# Patient Record
Sex: FEMALE | Race: BLACK OR AFRICAN AMERICAN
[De-identification: names, ages, dates, MRNs, and addresses within clinical notes are randomized per-mention and may not be internally consistent; named-entity substitution may affect disease eponyms.]

---

## 2018-03-01 ENCOUNTER — HOSPITAL ENCOUNTER (EMERGENCY)
Dept: HOSPITAL 61 - ER | Age: 43
Discharge: HOME | End: 2018-03-01
Payer: SELF-PAY

## 2018-03-01 DIAGNOSIS — K59.00: Primary | ICD-10-CM

## 2018-03-01 DIAGNOSIS — F17.210: ICD-10-CM

## 2018-03-01 PROCEDURE — 99284 EMERGENCY DEPT VISIT MOD MDM: CPT

## 2018-03-01 PROCEDURE — 74018 RADEX ABDOMEN 1 VIEW: CPT

## 2018-03-01 RX ADMIN — ONDANSETRON 1 MG: 4 TABLET, ORALLY DISINTEGRATING ORAL at 20:50

## 2018-03-01 RX ADMIN — DOCUSATE SODIUM 1 MG: 283 LIQUID RECTAL at 19:45

## 2018-06-13 ENCOUNTER — HOSPITAL ENCOUNTER (EMERGENCY)
Dept: HOSPITAL 61 - ER | Age: 43
Discharge: LEFT BEFORE DISCHARGE | End: 2018-06-13
Payer: SELF-PAY

## 2018-06-13 DIAGNOSIS — R07.89: Primary | ICD-10-CM

## 2018-06-13 DIAGNOSIS — F17.200: ICD-10-CM

## 2018-06-13 DIAGNOSIS — F14.10: ICD-10-CM

## 2018-06-13 DIAGNOSIS — F12.10: ICD-10-CM

## 2018-06-13 LAB
ADD MAN DIFF?: NO
ALBUMIN SERPL-MCNC: 3.8 G/DL (ref 3.4–5)
ALBUMIN/GLOB SERPL: 1 {RATIO} (ref 1–1.7)
ALP SERPL-CCNC: 90 U/L (ref 46–116)
ALT (SGPT): 26 U/L (ref 14–59)
AMPHETAMINE/METHAMPHETAMINE: (no result)
ANION GAP SERPL CALC-SCNC: 10 MMOL/L (ref 6–14)
AST SERPL-CCNC: 22 U/L (ref 15–37)
BACTERIA,URINE: (no result) /HPF
BARBITURATES: (no result)
BASO #: 0.1 X10^3/UL (ref 0–0.2)
BASO %: 1 % (ref 0–3)
BENZODIAZEPINES: (no result)
BILIRUBIN,URINE: NEGATIVE
BLOOD UREA NITROGEN: 9 MG/DL (ref 7–20)
BUN/CREAT SERPL: 10 (ref 6–20)
CALCIUM: 9.3 MG/DL (ref 8.5–10.1)
CANNABINOIDS: (no result)
CHLORIDE: 105 MMOL/L (ref 98–107)
CK SERPL-CCNC: 147 U/L (ref 26–192)
CKMB INDEX: 0.3 % (ref 0–4)
CKMB MASS: < 0.5 NG/ML (ref 0–3.6)
CLARITY,URINE: CLEAR
CO2 SERPL-SCNC: 24 MMOL/L (ref 21–32)
COCAINE: (no result)
COLOR,URINE: YELLOW
CREAT SERPL-MCNC: 0.9 MG/DL (ref 0.6–1)
EOS #: 0.1 X10^3/UL (ref 0–0.7)
EOS %: 2 % (ref 0–3)
ETHANOL, URINE: (no result)
GFR SERPLBLD BASED ON 1.73 SQ M-ARVRAT: 83.1 ML/MIN
GLOBULIN SER-MCNC: 4 G/DL (ref 2.2–3.8)
GLUCOSE SERPL-MCNC: 109 MG/DL (ref 70–99)
GLUCOSE,URINE: NEGATIVE MG/DL
HCG SERPL-ACNC: 9 X10^3/UL (ref 4–11)
HEMATOCRIT: 36.4 % (ref 36–47)
HEMOGLOBIN: 12.6 G/DL (ref 12–15.5)
LYMPH #: 3.3 X10^3/UL (ref 1–4.8)
LYMPH %: 37 % (ref 24–48)
MAGNESIUM: 2.3 MG/DL (ref 1.8–2.4)
MEAN CORPUSCULAR HEMOGLOBIN: 29 PG (ref 25–35)
MEAN CORPUSCULAR HGB CONC: 35 G/DL (ref 31–37)
MEAN CORPUSCULAR VOLUME: 84 FL (ref 79–100)
METHADONE: (no result)
MONO #: 0.4 X10^3/UL (ref 0–1.1)
MONO %: 5 % (ref 0–9)
NEUT #: 5 X10^3UL (ref 1.8–7.7)
NEUT %: 56 % (ref 31–73)
NITRITE,URINE: NEGATIVE
NT-PRO BNP: 116 PG/ML (ref 0–124)
OPIATES: (no result)
PH,URINE: 6
PHENCYCLIDINE: (no result)
PLATELET COUNT: 247 X10^3/UL (ref 140–400)
POTASSIUM SERPL-SCNC: 4.2 MMOL/L (ref 3.5–5.1)
PROTEIN,URINE: NEGATIVE MG/DL
RBC,URINE: 0 /HPF (ref 0–2)
RED BLOOD COUNT: 4.35 X10^6/UL (ref 3.5–5.4)
RED CELL DISTRIBUTION WIDTH: 14.6 % (ref 11.5–14.5)
SODIUM: 139 MMOL/L (ref 136–145)
SPECIFIC GRAVITY,URINE: >=1.03
SQUAMOUS EPITHELIAL CELL,UR: (no result) /LPF
THYROID STIM HORMONE (TSH): 1.96 UIU/ML (ref 0.36–3.74)
TOTAL BILIRUBIN: 0.3 MG/DL (ref 0.2–1)
TOTAL PROTEIN: 7.8 G/DL (ref 6.4–8.2)
TROPONINI: < 0.017 NG/ML (ref 0–0.06)
UROBILINOGEN,URINE: 1 MG/DL

## 2018-06-13 PROCEDURE — 93005 ELECTROCARDIOGRAM TRACING: CPT

## 2018-06-13 PROCEDURE — 83880 ASSAY OF NATRIURETIC PEPTIDE: CPT

## 2018-06-13 PROCEDURE — 81001 URINALYSIS AUTO W/SCOPE: CPT

## 2018-06-13 PROCEDURE — 84484 ASSAY OF TROPONIN QUANT: CPT

## 2018-06-13 PROCEDURE — 84443 ASSAY THYROID STIM HORMONE: CPT

## 2018-06-13 PROCEDURE — 36415 COLL VENOUS BLD VENIPUNCTURE: CPT

## 2018-06-13 PROCEDURE — 80053 COMPREHEN METABOLIC PANEL: CPT

## 2018-06-13 PROCEDURE — 82553 CREATINE MB FRACTION: CPT

## 2018-06-13 PROCEDURE — 85025 COMPLETE CBC W/AUTO DIFF WBC: CPT

## 2018-06-13 PROCEDURE — 83735 ASSAY OF MAGNESIUM: CPT

## 2018-06-13 PROCEDURE — 99285 EMERGENCY DEPT VISIT HI MDM: CPT

## 2018-06-13 PROCEDURE — 80307 DRUG TEST PRSMV CHEM ANLYZR: CPT

## 2018-06-13 PROCEDURE — 71045 X-RAY EXAM CHEST 1 VIEW: CPT

## 2018-06-13 RX ADMIN — ASPIRIN 325 MG ORAL TABLET 1 MG: 325 PILL ORAL at 13:33

## 2018-06-13 RX ADMIN — BACITRACIN 1 MLS/HR: 5000 INJECTION, POWDER, FOR SOLUTION INTRAMUSCULAR at 13:34

## 2018-12-20 ENCOUNTER — HOSPITAL ENCOUNTER (EMERGENCY)
Dept: HOSPITAL 61 - ER | Age: 43
Discharge: HOME | End: 2018-12-20
Payer: SELF-PAY

## 2018-12-20 VITALS — WEIGHT: 170 LBS | HEIGHT: 64 IN | BODY MASS INDEX: 29.02 KG/M2

## 2018-12-20 VITALS — SYSTOLIC BLOOD PRESSURE: 122 MMHG | DIASTOLIC BLOOD PRESSURE: 80 MMHG

## 2018-12-20 DIAGNOSIS — N94.6: ICD-10-CM

## 2018-12-20 DIAGNOSIS — F17.200: ICD-10-CM

## 2018-12-20 DIAGNOSIS — R11.2: ICD-10-CM

## 2018-12-20 DIAGNOSIS — R10.30: Primary | ICD-10-CM

## 2018-12-20 LAB
ANION GAP SERPL CALC-SCNC: 12 MMOL/L (ref 6–14)
APTT PPP: YELLOW S
BACTERIA #/AREA URNS HPF: (no result) /HPF
BASOPHILS # BLD AUTO: 0.1 X10^3/UL (ref 0–0.2)
BASOPHILS NFR BLD: 1 % (ref 0–3)
BILIRUB UR QL STRIP: NEGATIVE
BUN SERPL-MCNC: 6 MG/DL (ref 7–20)
CALCIUM SERPL-MCNC: 9.5 MG/DL (ref 8.5–10.1)
CHLORIDE SERPL-SCNC: 102 MMOL/L (ref 98–107)
CO2 SERPL-SCNC: 23 MMOL/L (ref 21–32)
CREAT SERPL-MCNC: 0.7 MG/DL (ref 0.6–1)
EOSINOPHIL NFR BLD: 0.1 X10^3/UL (ref 0–0.7)
EOSINOPHIL NFR BLD: 1 % (ref 0–3)
ERYTHROCYTE [DISTWIDTH] IN BLOOD BY AUTOMATED COUNT: 14.4 % (ref 11.5–14.5)
FIBRINOGEN PPP-MCNC: (no result) MG/DL
GFR SERPLBLD BASED ON 1.73 SQ M-ARVRAT: 110.5 ML/MIN
GLUCOSE SERPL-MCNC: 91 MG/DL (ref 70–99)
HCT VFR BLD CALC: 35.6 % (ref 36–47)
HGB BLD-MCNC: 12.6 G/DL (ref 12–15.5)
LYMPHOCYTES # BLD: 3.2 X10^3/UL (ref 1–4.8)
LYMPHOCYTES NFR BLD AUTO: 30 % (ref 24–48)
MCH RBC QN AUTO: 29 PG (ref 25–35)
MCHC RBC AUTO-ENTMCNC: 35 G/DL (ref 31–37)
MCV RBC AUTO: 82 FL (ref 79–100)
MONO #: 0.5 X10^3/UL (ref 0–1.1)
MONOCYTES NFR BLD: 5 % (ref 0–9)
NEUT #: 6.6 X10^3UL (ref 1.8–7.7)
NEUTROPHILS NFR BLD AUTO: 63 % (ref 31–73)
NITRITE UR QL STRIP: NEGATIVE
PH UR STRIP: 7.5 [PH]
PLATELET # BLD AUTO: 243 X10^3/UL (ref 140–400)
POTASSIUM SERPL-SCNC: 3.9 MMOL/L (ref 3.5–5.1)
PROT UR STRIP-MCNC: NEGATIVE MG/DL
RBC # BLD AUTO: 4.33 X10^6/UL (ref 3.5–5.4)
RBC #/AREA URNS HPF: (no result) /HPF (ref 0–2)
SODIUM SERPL-SCNC: 137 MMOL/L (ref 136–145)
SQUAMOUS #/AREA URNS LPF: (no result) /LPF
UROBILINOGEN UR-MCNC: 1 MG/DL
WBC # BLD AUTO: 10.5 X10^3/UL (ref 4–11)
WBC #/AREA URNS HPF: (no result) /HPF (ref 0–4)

## 2018-12-20 PROCEDURE — 81025 URINE PREGNANCY TEST: CPT

## 2018-12-20 PROCEDURE — 87086 URINE CULTURE/COLONY COUNT: CPT

## 2018-12-20 PROCEDURE — 36415 COLL VENOUS BLD VENIPUNCTURE: CPT

## 2018-12-20 PROCEDURE — 85025 COMPLETE CBC W/AUTO DIFF WBC: CPT

## 2018-12-20 PROCEDURE — 76856 US EXAM PELVIC COMPLETE: CPT

## 2018-12-20 PROCEDURE — 80048 BASIC METABOLIC PNL TOTAL CA: CPT

## 2018-12-20 PROCEDURE — 76830 TRANSVAGINAL US NON-OB: CPT

## 2018-12-20 PROCEDURE — 81001 URINALYSIS AUTO W/SCOPE: CPT

## 2018-12-20 NOTE — PHYS DOC
Past Medical History


Past Medical History:  Ovarian Cyst, Other


Additional Past Medical Histor:  PRE CANCEROUS CERVICAL SCREEN


Past Surgical History:  Tonsillectomy


Additional Information:  


1/2 PACK/DAY


Alcohol Use:  Occasionally


Drug Use:  Cocaine, Marijuana





Adult General


Chief Complaint


Chief Complaint:  VAGINAL BLEEDING





HPI


HPI





33-year-old female presents to ER via POV with complaints of lower mid 

abdominal pain which started yesterday that time of her menstrual cycle onset. 

Patient reports over the past 3-4 months she's had this type of discomfort with 

her menstrual cycles. Patient reports she has also had nausea and vomiting over 

the past several months where her menstrual cycle start. She reports this 

morning since 7 AM she has gone through 3 peripads. Patient denies any vaginal 

discharge when she is not on her cycle. Patient denies concerns for STDs as she'

s been with her partner for 20 years. Patient denies fever or urinary symptoms. 

Patient reports she has had regular bowel movements and denies change in her 

appetite. Patient did take Midol this morning.





Review of Systems


Review of Systems





Constitutional: Denies fever or chills []


Eyes: Denies change in visual acuity, redness, or eye pain []


HENT: Denies nasal congestion or sore throat []


Respiratory: Denies cough or shortness of breath []


Cardiovascular: No additional information not addressed in HPI []


GI: Denies bloody stools or diarrhea []


: Denies dysuria or hematuria []


Musculoskeletal: Denies back pain or joint pain []


Integument: Denies rash or skin lesions []


Neurologic: Denies headache, focal weakness or sensory changes []





All other systems were reviewed and found to be within normal limits, except as 

documented in this note.





Allergies


Allergies





Allergies








Coded Allergies Type Severity Reaction Last Updated Verified


 


  No Known Drug Allergies    4/24/16 No











Physical Exam


Physical Exam





Constitutional: Well developed, well nourished, no acute distress, non-toxic 

appearance. []


HENT: Normocephalic, atraumatic, bilateral external ears normal, oropharynx 

moist, no oral exudates, nose normal. []


Eyes: PERRLA, EOMI, conjunctiva normal, no discharge. [] 


Neck: Normal range of motion, no tenderness, supple, no stridor. [] 


Cardiovascular: Heart rate regular rhythm, no murmur []


Lungs & Thorax:  Bilateral breath sounds clear to auscultation []


Abdomen: Bowel sounds normal, soft, no tenderness, no masses, no pulsatile 

masses. [] 


Skin: Warm, dry, no erythema, no rash. [] 


Back: No tenderness, no CVA tenderness. [] 


Extremities: No tenderness, no cyanosis, no clubbing, ROM intact, no edema. [] 


Neurologic: Alert and oriented X 3, normal motor function, normal sensory 

function, no focal deficits noted. []


Psychologic: Affect normal, judgement normal, mood normal. []





Current Patient Data


Vital Signs





 Vital Signs








  Date Time  Temp Pulse Resp B/P (MAP) Pulse Ox O2 Delivery O2 Flow Rate FiO2


 


12/20/18 13:00  75 18 122/80 (94) 98 Room Air  


 


12/20/18 10:55 97.9       





 97.9       








Lab Values





 Laboratory Tests








Test


 12/20/18


12:21 12/20/18


12:30 12/20/18


12:33


 


Urine Collection Type Unknown    


 


Urine Color Yellow    


 


Urine Clarity Cloudy    


 


Urine pH 7.5    


 


Urine Specific Gravity 1.020    


 


Urine Protein


 Negative mg/dL


(NEG-TRACE) 


 





 


Urine Glucose (UA)


 Negative mg/dL


(NEG) 


 





 


Urine Ketones (Stick)


 Negative mg/dL


(NEG) 


 





 


Urine Blood Small (NEG)    


 


Urine Nitrite


 Negative (NEG)


 


 





 


Urine Bilirubin


 Negative (NEG)


 


 





 


Urine Urobilinogen Dipstick


 1.0 mg/dL (0.2


mg/dL) 


 





 


Urine Leukocyte Esterase Small (NEG)    


 


Urine RBC


 6-10 /HPF


(0-2) 


 





 


Urine WBC


 5-10 /HPF


(0-4) 


 





 


Urine Squamous Epithelial


Cells Few /LPF  


 


 





 


Urine Bacteria


 Few /HPF


(0-FEW) 


 





 


Urine Mucus Mod /LPF    


 


White Blood Count


 


 10.5 x10^3/uL


(4.0-11.0) 





 


Red Blood Count


 


 4.33 x10^6/uL


(3.50-5.40) 





 


Hemoglobin


 


 12.6 g/dL


(12.0-15.5) 





 


Hematocrit


 


 35.6 %


(36.0-47.0)  L 





 


Mean Corpuscular Volume


 


 82 fL ()


 





 


Mean Corpuscular Hemoglobin  29 pg (25-35)   


 


Mean Corpuscular Hemoglobin


Concent 


 35 g/dL


(31-37) 





 


Red Cell Distribution Width


 


 14.4 %


(11.5-14.5) 





 


Platelet Count


 


 243 x10^3/uL


(140-400) 





 


Neutrophils (%) (Auto)  63 % (31-73)   


 


Lymphocytes (%) (Auto)  30 % (24-48)   


 


Monocytes (%) (Auto)  5 % (0-9)   


 


Eosinophils (%) (Auto)  1 % (0-3)   


 


Basophils (%) (Auto)  1 % (0-3)   


 


Neutrophils # (Auto)


 


 6.6 x10^3uL


(1.8-7.7) 





 


Lymphocytes # (Auto)


 


 3.2 x10^3/uL


(1.0-4.8) 





 


Monocytes # (Auto)


 


 0.5 x10^3/uL


(0.0-1.1) 





 


Eosinophils # (Auto)


 


 0.1 x10^3/uL


(0.0-0.7) 





 


Basophils # (Auto)


 


 0.1 x10^3/uL


(0.0-0.2) 





 


Sodium Level


 


 137 mmol/L


(136-145) 





 


Potassium Level


 


 3.9 mmol/L


(3.5-5.1) 





 


Chloride Level


 


 102 mmol/L


() 





 


Carbon Dioxide Level


 


 23 mmol/L


(21-32) 





 


Anion Gap  12 (6-14)   


 


Blood Urea Nitrogen


 


 6 mg/dL (7-20)


L 





 


Creatinine


 


 0.7 mg/dL


(0.6-1.0) 





 


Estimated GFR


(Cockcroft-Gault) 


 110.5  


 





 


Glucose Level


 


 91 mg/dL


(70-99) 





 


Calcium Level


 


 9.5 mg/dL


(8.5-10.1) 





 


POC Urine HCG, Qualitative


 


 


 Hcg negative


(Negative)





 Laboratory Tests


12/20/18 12:30








 Laboratory Tests


12/20/18 12:30














Microbiology


12/20/18 Urine Culture - Final, Complete


           


12/20/18 Urine Culture Result 1 (DEVAN) - Final, Complete





EKG


EKG


[]





Radiology/Procedures


Radiology/Procedures


[]





Course & Med Decision Making


Course & Med Decision Making


Pertinent Labs and Imaging studies reviewed. (See chart for details)





1322: RN reports pt is requesting to be discharged and is not wanting pelvic 

exam in ER with plans to f/u with OB/GYN and will have further testing and 

eval. done at their office. She has remained stable while in ER denying any 

increase in abd. pain or vag. bleeding.





Dragon Disclaimer


Dragon Disclaimer


This electronic medical record was generated, in whole or in part, using a 

voice recognition dictation system.





Departure


Departure


Impression:  


 Primary Impression:  


 Abdominal pain


 Additional Impression:  


 Painful menstrual periods


Referrals:  


NO PCP (PCP)








MATT CHARLES Jr, MD


Patient Instructions:  Abdominal Pain, Dysmenorrhea





Additional Instructions:  


As discussed you should follow-up with OB/GYN for further care and evaluation 

for your ongoing symptoms during your menstrual cycle.





You can continue taking Tylenol and/or Midol for pain as directed on container.





Problem Qualifiers











EBONY VALENZUELA Dec 20, 2018 11:28

## 2018-12-20 NOTE — RAD
Pelvic ultrasound

 

Clinical Indication:SUPRAPUBIC PAIN WITH HEAVY MENSES. . 

 

TRANSABDOMINAL SCAN

Left ovary measures about 4 cm diameter with positive blood flow. Right 

ovary not well seen. Uterus poorly seen.

 

TRANSVAGINAL SCAN

Uterus:

*  Size (in centimeters): 9.2 longitudinal by 5.1 AP by 5.8 wide

*  Appearance: Hypoechoic nodule in the anterior uterus measures 15 mm.

*  Endometrium: 9 mm mm endometrial stripe thickness. Uniform appearance.

 

Right ovary:

*  Size: 3.2 cm long axis.

*  Blood flow: Intact

*  Appearance: Contains a 2.5 cm cyst

 

Left ovary:

*  Size: 4.3 cm long axis.

*  Blood flow: Intact

*  Appearance: Cyst identified, measures 2.5 cm.

 

Free fluid: None visualized

 

IMPRESSION: 

1. Bilateral ovarian cysts.

2. Hypoechoic nodule in the uterus, would most commonly represent a 

fibroid.

 

Electronically signed by: Vasquez Larios MD (12/20/2018 12:44 PM) 

[id number]

## 2019-06-20 ENCOUNTER — HOSPITAL ENCOUNTER (EMERGENCY)
Dept: HOSPITAL 61 - ER | Age: 44
Discharge: HOME | End: 2019-06-20
Payer: SELF-PAY

## 2019-06-20 VITALS — DIASTOLIC BLOOD PRESSURE: 90 MMHG | SYSTOLIC BLOOD PRESSURE: 145 MMHG

## 2019-06-20 VITALS — HEIGHT: 64 IN | WEIGHT: 174 LBS | BODY MASS INDEX: 29.71 KG/M2

## 2019-06-20 DIAGNOSIS — R42: ICD-10-CM

## 2019-06-20 DIAGNOSIS — R55: ICD-10-CM

## 2019-06-20 DIAGNOSIS — Z90.89: ICD-10-CM

## 2019-06-20 DIAGNOSIS — N39.0: Primary | ICD-10-CM

## 2019-06-20 LAB
APTT PPP: YELLOW S
BACTERIA #/AREA URNS HPF: (no result) /HPF
BILIRUB UR QL STRIP: NEGATIVE
FIBRINOGEN PPP-MCNC: (no result) MG/DL
NITRITE UR QL STRIP: NEGATIVE
PH UR STRIP: 5.5 [PH]
PROT UR STRIP-MCNC: NEGATIVE MG/DL
RBC #/AREA URNS HPF: (no result) /HPF (ref 0–2)
SQUAMOUS #/AREA URNS LPF: (no result) /LPF
T VAGINALIS URNS QL MICRO: PRESENT
UROBILINOGEN UR-MCNC: 1 MG/DL
WBC #/AREA URNS HPF: (no result) /HPF (ref 0–4)

## 2019-06-20 PROCEDURE — 81001 URINALYSIS AUTO W/SCOPE: CPT

## 2019-06-20 PROCEDURE — 99284 EMERGENCY DEPT VISIT MOD MDM: CPT

## 2019-06-20 PROCEDURE — 81025 URINE PREGNANCY TEST: CPT

## 2019-06-20 PROCEDURE — 87086 URINE CULTURE/COLONY COUNT: CPT

## 2019-06-20 PROCEDURE — 82962 GLUCOSE BLOOD TEST: CPT

## 2019-06-20 NOTE — PHYS DOC
Past Medical History


Past Medical History:  Ovarian Cyst, Other


Additional Past Medical Histor:  PRE CANCEROUS CERVICAL SCREEN


Past Surgical History:  Tonsillectomy


Alcohol Use:  Occasionally


Drug Use:  Cocaine, Marijuana





Adult General


Chief Complaint


Chief Complaint:  MULTIPLE COMPLAINTS





[facility]





43-year-old female presents with a couple different complaints. She states she's

been dizzy and had some near syncopal episodes over the fat past few days. She 

complains of low back and flank pain. She's also had urinary urgency and 

frequency. She denies any fever chills or sweats. She denies any gross 

hematuria. She states she's not in significant amount of pain now. She does 

admit to having a mild headache.[]





Review of Systems


Review of Systems





Constitutional: Denies fever or chills []


Eyes: Denies change in visual acuity, redness, or eye pain []


HENT: Denies nasal congestion or sore throat []


Respiratory: Denies cough or shortness of breath []


Cardiovascular: No additional information not addressed in HPI []


GI: Denies abdominal pain, nausea, vomiting, bloody stools or diarrhea []


: Per history of present illness[]


Musculoskeletal: Reports low back pain[]


Integument: Denies rash or skin lesions []


Neurologic: Reports a headache[]








All other systems were reviewed and found to be within normal limits, except as 

documented in this note.





Allergies


Allergies





Allergies








Coded Allergies Type Severity Reaction Last Updated Verified


 


  No Known Drug Allergies    4/24/16 No











Physical Exam


Physical Exam





Constitutional: Well developed, well nourished, no acute distress, non-toxic 

appearance. []


HENT: Normocephalic, atraumatic, bilateral external ears normal, oropharynx 

moist, no oral exudates, nose normal. []


Eyes: PERRLA, EOMI, conjunctiva normal, no discharge. [] 


Neck: Normal range of motion, no tenderness, supple, no stridor. [] 


Cardiovascular:Heart rate regular rhythm, no murmur []


Lungs & Thorax:  Bilateral breath sounds clear to auscultation []


Abdomen: Bowel sounds normal, soft, no tenderness, no masses, no pulsatile 

masses. [] 


Skin: Warm, dry, no erythema, no rash. [] 


Back: No tenderness, no CVA tenderness. [] 


Extremities: No tenderness, no cyanosis, no clubbing, ROM intact, no edema. [] 


Neurologic: Alert and oriented X 3, normal motor function, normal sensory func

tion, no focal deficits noted. []


Psychologic: Affect normal, judgement normal, mood normal. []





Current Patient Data


Lab Values





                                Laboratory Tests








Test


 6/20/19


19:40 6/20/19


19:58 6/20/19


20:13


 


Urine Color Yellow    


 


Urine Clarity Cloudy    


 


Urine pH 5.5    


 


Urine Specific Gravity 1.025    


 


Urine Protein


 Negative mg/dL


(NEG-TRACE) 


 





 


Urine Glucose (UA)


 Negative mg/dL


(NEG) 


 





 


Urine Ketones (Stick)


 Negative mg/dL


(NEG) 


 





 


Urine Blood Trace (NEG)    


 


Urine Nitrite


 Negative (NEG)


 


 





 


Urine Bilirubin


 Negative (NEG)


 


 





 


Urine Urobilinogen Dipstick


 1.0 mg/dL (0.2


mg/dL) 


 





 


Urine Leukocyte Esterase Small (NEG)    


 


Urine RBC


 Occ /HPF (0-2)


 


 





 


Urine WBC


 1-4 /HPF (0-4)


 


 





 


Urine Squamous Epithelial


Cells Occ /LPF  


 


 





 


Urine Bacteria


 Few /HPF


(0-FEW) 


 





 


Urine Trichomonas Present    


 


POC Urine HCG, Qualitative


 


 Hcg negative


(Negative) 





 


Glucose (Fingerstick)


 


 


 98 mg/dL


(70-99)











EKG


EKG


[]





Radiology/Procedures


Radiology/Procedures


[]





Course & Med Decision Making


Course & Med Decision Making


Pertinent Labs and Imaging studies reviewed. (See chart for details)





[]





Dragon Disclaimer


Dragon Disclaimer


This electronic medical record was generated, in whole or in part, using a voice

 recognition dictation system.





Departure


Departure


Impression:  


   Primary Impression:  


   Urinary tract infection


Disposition:  01 HOME, SELF-CARE


Condition:  STABLE


Referrals:  


NO PCP (PCP)


Patient Instructions:  Urinary Tract Infection





Additional Instructions:  


Take your antibiotics as directed. Return to the emergency with any new or 

concerning symptoms


Scripts


Sulfamethoxazole/Trimethoprim (BACTRIM DS TABLET) 1 Each Tablet


1 TAB PO BID, #6 TAB


   Prov: RICHY GUTIERREZ DO         6/20/19





Problem Qualifiers








   Primary Impression:  


   Urinary tract infection


   Urinary tract infection type:  site unspecified  Hematuria presence:  without

    hematuria  Qualified Codes:  N39.0 - Urinary tract infection, site not 

   specified








RICHY GUTIERREZ DO             Jun 20, 2019 20:40

## 2021-04-05 ENCOUNTER — HOSPITAL ENCOUNTER (EMERGENCY)
Dept: HOSPITAL 61 - ER | Age: 46
Discharge: HOME | End: 2021-04-05
Payer: SELF-PAY

## 2021-04-05 VITALS — HEIGHT: 64 IN | WEIGHT: 175.27 LBS | BODY MASS INDEX: 29.92 KG/M2

## 2021-04-05 VITALS — SYSTOLIC BLOOD PRESSURE: 124 MMHG | DIASTOLIC BLOOD PRESSURE: 78 MMHG

## 2021-04-05 DIAGNOSIS — F17.200: ICD-10-CM

## 2021-04-05 DIAGNOSIS — R10.32: ICD-10-CM

## 2021-04-05 DIAGNOSIS — Z98.890: ICD-10-CM

## 2021-04-05 DIAGNOSIS — F12.90: ICD-10-CM

## 2021-04-05 DIAGNOSIS — N39.0: Primary | ICD-10-CM

## 2021-04-05 DIAGNOSIS — M25.562: ICD-10-CM

## 2021-04-05 DIAGNOSIS — F14.90: ICD-10-CM

## 2021-04-05 DIAGNOSIS — R11.2: ICD-10-CM

## 2021-04-05 LAB
ALBUMIN SERPL-MCNC: 3.2 G/DL (ref 3.4–5)
ALBUMIN/GLOB SERPL: 0.9 {RATIO} (ref 1–1.7)
ALP SERPL-CCNC: 84 U/L (ref 46–116)
ALT SERPL-CCNC: 24 U/L (ref 14–59)
ANION GAP SERPL CALC-SCNC: 9 MMOL/L (ref 6–14)
APTT PPP: (no result) S
AST SERPL-CCNC: 15 U/L (ref 15–37)
BACTERIA #/AREA URNS HPF: 0 /HPF
BASOPHILS # BLD AUTO: 0.1 X10^3/UL (ref 0–0.2)
BASOPHILS NFR BLD: 1 % (ref 0–3)
BILIRUB SERPL-MCNC: 0.2 MG/DL (ref 0.2–1)
BILIRUB UR QL STRIP: (no result)
BUN SERPL-MCNC: 7 MG/DL (ref 7–20)
BUN/CREAT SERPL: 8 (ref 6–20)
CALCIUM SERPL-MCNC: 8.7 MG/DL (ref 8.5–10.1)
CHLORIDE SERPL-SCNC: 106 MMOL/L (ref 98–107)
CO2 SERPL-SCNC: 27 MMOL/L (ref 21–32)
CREAT SERPL-MCNC: 0.9 MG/DL (ref 0.6–1)
EOSINOPHIL NFR BLD: 0.3 X10^3/UL (ref 0–0.7)
EOSINOPHIL NFR BLD: 4 % (ref 0–3)
ERYTHROCYTE [DISTWIDTH] IN BLOOD BY AUTOMATED COUNT: 15.3 % (ref 11.5–14.5)
FIBRINOGEN PPP-MCNC: CLEAR MG/DL
GFR SERPLBLD BASED ON 1.73 SQ M-ARVRAT: 81.9 ML/MIN
GLUCOSE SERPL-MCNC: 105 MG/DL (ref 70–99)
HCT VFR BLD CALC: 34.5 % (ref 36–47)
HGB BLD-MCNC: 11.8 G/DL (ref 12–15.5)
LYMPHOCYTES # BLD: 3.4 X10^3/UL (ref 1–4.8)
LYMPHOCYTES NFR BLD AUTO: 37 % (ref 24–48)
MCH RBC QN AUTO: 28 PG (ref 25–35)
MCHC RBC AUTO-ENTMCNC: 34 G/DL (ref 31–37)
MCV RBC AUTO: 83 FL (ref 79–100)
MONO #: 0.6 X10^3/UL (ref 0–1.1)
MONOCYTES NFR BLD: 6 % (ref 0–9)
NEUT #: 4.8 X10^3/UL (ref 1.8–7.7)
NEUTROPHILS NFR BLD AUTO: 52 % (ref 31–73)
NITRITE UR QL STRIP: NEGATIVE
PH UR STRIP: 6 [PH]
PLATELET # BLD AUTO: 276 X10^3/UL (ref 140–400)
POTASSIUM SERPL-SCNC: 3.8 MMOL/L (ref 3.5–5.1)
PROT SERPL-MCNC: 6.9 G/DL (ref 6.4–8.2)
PROT UR STRIP-MCNC: NEGATIVE MG/DL
RBC # BLD AUTO: 4.18 X10^6/UL (ref 3.5–5.4)
RBC #/AREA URNS HPF: (no result) /HPF (ref 0–2)
SODIUM SERPL-SCNC: 142 MMOL/L (ref 136–145)
UROBILINOGEN UR-MCNC: 1 MG/DL
WBC # BLD AUTO: 9.3 X10^3/UL (ref 4–11)
WBC #/AREA URNS HPF: (no result) /HPF (ref 0–4)

## 2021-04-05 PROCEDURE — 81001 URINALYSIS AUTO W/SCOPE: CPT

## 2021-04-05 PROCEDURE — 80053 COMPREHEN METABOLIC PANEL: CPT

## 2021-04-05 PROCEDURE — 36415 COLL VENOUS BLD VENIPUNCTURE: CPT

## 2021-04-05 PROCEDURE — 96375 TX/PRO/DX INJ NEW DRUG ADDON: CPT

## 2021-04-05 PROCEDURE — 85025 COMPLETE CBC W/AUTO DIFF WBC: CPT

## 2021-04-05 PROCEDURE — 96374 THER/PROPH/DIAG INJ IV PUSH: CPT

## 2021-04-05 PROCEDURE — 81025 URINE PREGNANCY TEST: CPT

## 2021-04-05 PROCEDURE — 74176 CT ABD & PELVIS W/O CONTRAST: CPT

## 2021-04-05 PROCEDURE — 87086 URINE CULTURE/COLONY COUNT: CPT

## 2021-04-05 PROCEDURE — 99285 EMERGENCY DEPT VISIT HI MDM: CPT

## 2021-04-05 NOTE — RAD
Study: CT abdomen/pelvis without intravenous contrast



Indication: Flank pain.



Comparison: None. 



Technique: Helical CT imaging performed of the abdomen and pelvis without the use of intravenous cont
rast. Sagittal and coronal reformats were obtained. 



One or more of the following individualized dose reduction techniques were utilized for this examinat
ion:  



1. Automated exposure control

2. Adjustment of the mA and/or kV according to patient size

3. Use of iterative reconstruction technique.



Findings:



Inherently limited evaluation without intravenous contrast.



Right lower lobe pulmonary nodule measuring 6 mm transverse.



No focal hepatic parenchymal abnormality. Unremarkable gallbladder, biliary tree, pancreas, spleen an
d adrenal glands.



No intrarenal stone. No hydronephrosis. Decompressed urinary bladder.



Left adnexal fullness with slight deviation of the uterus to the right. Unremarkable right adnexa.



Mild amount of well-formed stool within the colon. Normal appendix. Nonobstructed small bowel. Unrema
rkable stomach. Mild aortic and iliac calcific atherosclerosis. No lymphadenopathy. No free fluid or 
pneumoperitoneum.



Unremarkable body wall soft tissues. Lower lumbar facet arthrosis greatest on the right at L5-S1. Mil
d to moderate osseous neural foraminal stenosis on the right at this level. No evidence for central c
anal narrowing. Degenerative changes at the pubic symphysis and mildly at the hips.





Impression:



1.  No nephrolithiasis or collecting system dilatation on the right or left to explain reported flank
 pain.

2.  Asymmetric fullness at the left adnexa. This is most likely benign given patient age but given in
complete assessment on this unenhanced CT consider eventual ultrasound follow-up such as in 3 months.
 No free pelvic fluid suggests an inflammatory or infectious process.

3.  Right lower lobe pulmonary nodule measuring 6 mm. Per Fleischner guidelines, follow-up CT chest i
s recommended in 6 months.



Electronically signed by: LAADN JOHNSTON MD (4/5/2021 9:46 PM) [facility]

## 2021-04-05 NOTE — PHYS DOC
Past Medical History


Past Medical History:  No Pertinent History


Additional Past Medical Histor:  PRE CANCEROUS CERVICAL SCREEN


Past Surgical History:  No Surgical History


Smoking Status:  Current Every Day Smoker


Alcohol Use:  Occasionally


Drug Use:  Cocaine, Marijuana





General Adult


EDM:


Chief Complaint:  ABDOMINAL PAIN





HPI:


HPI:





Patient is a 45  year old-year-old female presents with a chief complaint of 

left flank/buttocks pain.  Patient states she has had pain on and off for 1 

month.  Over the last few days  pain has returned and become more frequent and 

painful. .  Patient's flank pain is in the left flank radiates to the left groin

then to knee.  Patient has had associated nausea and vomiting.  Patient denies 

any urinary symptoms.  Patients last menstrual period was on saturday.  Patient 

denies injury.





Review of Systems:


Constitutional symptoms-  No fever, no chills.


Eyes- No Discharge, No Visual Loss


Respiratory symptoms-  No shortness of breath, No wheezing, No Dyspnea on 

Exertion


Cardiovascular Systems; No chest pain, No Palpitations, No syncope


Gastrointestinal symptoms:  NO abdominal pain, no nausea, no vomiting or 

diarrhea.


Genitourinary symptoms:  No dysuria.  


Musculoskeletal symptoms:  positive back pain  No extremity pain.


NEUROLOGICAL Symptoms:  No headache, no generalized weakness; No focal Weakness





Heart Score:


C/O Chest Pain:  N/A


Risk Factors:


Risk Factors:  DM, Current or recent (<one month) smoker, HTN, HLP, family 

history of CAD, obesity.


Risk Scores:


Score 0 - 3:  2.5% MACE over next 6 weeks - Discharge Home


Score 4 - 6:  20.3% MACE over next 6 weeks - Admit for Clinical Observation


Score 7 - 10:  72.7% MACE over next 6 weeks - Early Invasive Strategies





Allergies:


Allergies:





Allergies








Coded Allergies Type Severity Reaction Last Updated Verified


 


  No Known Drug Allergies    4/24/16 No











Physical Exam:


PE:


General: alert, no acute distress.


Skin: warm, dry and intact.


Head::  Normocephalic, atraumatic.


Neck:   Trachea midline.


Eyes: EOMI, Normal conjunctiva, No drainage


CARDIOVASCULAR:  Regular rate and rhythm


RESPIRATORY:  No respiratory distress


Back:  Full range of motion.


MUSCULOSKELETAL:  Full range of motion of bilateral upper and lower extremities.


GASTROINTESTINAL: Abdomen soft without rebound or guarding.


NEUROLOGICAL:  Alert and noted to person, place and time.  No neurological de

ficits observed


Psychiatric:  Cooperative.  Normal judgment





Current Patient Data:


Vital Signs:





                                   Vital Signs








  Date Time  Temp Pulse Resp B/P (MAP) Pulse Ox O2 Delivery O2 Flow Rate FiO2


 


4/5/21 19:07 97.9 78 22 123/83 (96) 97   





 97.9       











EKG:


EKG:


[]





Radiology/Procedures:


Radiology/Procedures:


[]





Course & Med Decision Making:


Course & Med Decision Making


Pertinent Labs and Imaging studies reviewed. (See chart for details)





[]





Dragon Disclaimer:


Dragon Disclaimer:


This electronic medical record was generated, in whole or in part, using a voice

 recognition dictation system.





Departure


Departure


Impression:  


   Primary Impression:  


   Flank pain


   Additional Impressions:  


   Urinary tract infection


   Sciatic leg pain


Disposition:  01 DC HOME SELF CARE/HOMELESS


Condition:  STABLE


Referrals:  


NO PCP (PCP)


Patient Instructions:  Flank Pain, Sciatica, Urinary Tract Infection


Scripts


Tramadol Hcl (ULTRAM) 50 Mg Tablet


1 TAB PO PRN Q6HRS PRN for pain MDD 4 Tablet(s) for 7 Days, #20 TAB 0 Refills


   Prov: JAYLEN NUNES DO         4/5/21 


Prednisone (PREDNISONE) 20 Mg Tablet


1 TAB PO UD for 12 Days, #15 TAB


   Take 2 tabs days 1,2,3


   1.5 tabs days 3,4,5


   1 tab days 6,7,8


   0.5 tab days 9,10,11


   Prov: JAYLEN NUNES DO         4/5/21 


Nitrofurantoin Macrocrystal (MACRODANTIN) 100 Mg Capsule


1 CAP PO BID for 7 Days, #14 CAP 0 Refills


   Prov: JAYLEN NUNES DO         4/5/21











JAYLEN NUNES DO             Apr 5, 2021 19:45

## 2022-02-21 ENCOUNTER — HOSPITAL ENCOUNTER (EMERGENCY)
Dept: HOSPITAL 61 - ER | Age: 47
Discharge: HOME | End: 2022-02-21
Payer: SELF-PAY

## 2022-02-21 VITALS — HEIGHT: 64 IN | BODY MASS INDEX: 29.77 KG/M2 | WEIGHT: 174.39 LBS

## 2022-02-21 VITALS — DIASTOLIC BLOOD PRESSURE: 72 MMHG | SYSTOLIC BLOOD PRESSURE: 148 MMHG

## 2022-02-21 DIAGNOSIS — N94.6: Primary | ICD-10-CM

## 2022-02-21 DIAGNOSIS — N83.202: ICD-10-CM

## 2022-02-21 DIAGNOSIS — F17.210: ICD-10-CM

## 2022-02-21 DIAGNOSIS — R91.1: ICD-10-CM

## 2022-02-21 LAB
ALBUMIN SERPL-MCNC: 3.3 G/DL (ref 3.4–5)
ALBUMIN/GLOB SERPL: 0.9 {RATIO} (ref 1–1.7)
ALP SERPL-CCNC: 84 U/L (ref 46–116)
ALT SERPL-CCNC: 23 U/L (ref 14–59)
AMORPH SED URNS QL MICRO: PRESENT /HPF
ANION GAP SERPL CALC-SCNC: 9 MMOL/L (ref 6–14)
APTT BLD: 32 SEC (ref 24–38)
APTT PPP: (no result) S
AST SERPL-CCNC: 17 U/L (ref 15–37)
BACTERIA #/AREA URNS HPF: (no result) /HPF
BASOPHILS # BLD AUTO: 0.1 X10^3/UL (ref 0–0.2)
BASOPHILS NFR BLD: 1 % (ref 0–3)
BILIRUB SERPL-MCNC: 0.2 MG/DL (ref 0.2–1)
BILIRUB UR QL STRIP: (no result)
BUN SERPL-MCNC: 8 MG/DL (ref 7–20)
BUN/CREAT SERPL: 11 (ref 6–20)
CALCIUM SERPL-MCNC: 8.4 MG/DL (ref 8.5–10.1)
CHLORIDE SERPL-SCNC: 106 MMOL/L (ref 98–107)
CO2 SERPL-SCNC: 26 MMOL/L (ref 21–32)
CREAT SERPL-MCNC: 0.7 MG/DL (ref 0.6–1)
EOSINOPHIL NFR BLD: 0.3 X10^3/UL (ref 0–0.7)
EOSINOPHIL NFR BLD: 4 % (ref 0–3)
ERYTHROCYTE [DISTWIDTH] IN BLOOD BY AUTOMATED COUNT: 15.4 % (ref 11.5–14.5)
FIBRINOGEN PPP-MCNC: (no result) MG/DL
GFR SERPLBLD BASED ON 1.73 SQ M-ARVRAT: 109 ML/MIN
GLUCOSE SERPL-MCNC: 116 MG/DL (ref 70–99)
HCT VFR BLD CALC: 33.5 % (ref 36–47)
HGB BLD-MCNC: 11.4 G/DL (ref 12–15.5)
LIPASE: 139 U/L (ref 73–393)
LYMPHOCYTES # BLD: 2.4 X10^3/UL (ref 1–4.8)
LYMPHOCYTES NFR BLD AUTO: 28 % (ref 24–48)
MCH RBC QN AUTO: 28 PG (ref 25–35)
MCHC RBC AUTO-ENTMCNC: 34 G/DL (ref 31–37)
MCV RBC AUTO: 82 FL (ref 79–100)
MONO #: 0.4 X10^3/UL (ref 0–1.1)
MONOCYTES NFR BLD: 5 % (ref 0–9)
NEUT #: 5.3 X10^3/UL (ref 1.8–7.7)
NEUTROPHILS NFR BLD AUTO: 62 % (ref 31–73)
NITRITE UR QL STRIP: (no result)
PH UR STRIP: (no result) [PH]
PLATELET # BLD AUTO: 344 X10^3/UL (ref 140–400)
POTASSIUM SERPL-SCNC: 4 MMOL/L (ref 3.5–5.1)
PROT SERPL-MCNC: 6.8 G/DL (ref 6.4–8.2)
PROT UR STRIP-MCNC: (no result) MG/DL
PROTHROMBIN TIME: 12.5 SEC (ref 11.7–14)
RBC # BLD AUTO: 4.09 X10^6/UL (ref 3.5–5.4)
RBC #/AREA URNS HPF: (no result) /HPF (ref 0–2)
SODIUM SERPL-SCNC: 141 MMOL/L (ref 136–145)
U PREG PATIENT: NEGATIVE
UROBILINOGEN UR-MCNC: (no result) MG/DL
WBC # BLD AUTO: 8.5 X10^3/UL (ref 4–11)

## 2022-02-21 PROCEDURE — 80053 COMPREHEN METABOLIC PANEL: CPT

## 2022-02-21 PROCEDURE — 36415 COLL VENOUS BLD VENIPUNCTURE: CPT

## 2022-02-21 PROCEDURE — 81001 URINALYSIS AUTO W/SCOPE: CPT

## 2022-02-21 PROCEDURE — 85025 COMPLETE CBC W/AUTO DIFF WBC: CPT

## 2022-02-21 PROCEDURE — 81025 URINE PREGNANCY TEST: CPT

## 2022-02-21 PROCEDURE — 85610 PROTHROMBIN TIME: CPT

## 2022-02-21 PROCEDURE — 99285 EMERGENCY DEPT VISIT HI MDM: CPT

## 2022-02-21 PROCEDURE — 83690 ASSAY OF LIPASE: CPT

## 2022-02-21 PROCEDURE — 87086 URINE CULTURE/COLONY COUNT: CPT

## 2022-02-21 PROCEDURE — 85730 THROMBOPLASTIN TIME PARTIAL: CPT

## 2022-02-21 PROCEDURE — 74177 CT ABD & PELVIS W/CONTRAST: CPT

## 2022-02-21 NOTE — RAD
EXAM: CT ABDOMEN/PELVIS WITH CONTRAST.



HISTORY: Abdominal pain, bloody stools.



TECHNIQUE: Computed tomography of the abdomen and pelvis was performed after the intravenous administ
ration of iodinated contrast. One or more of the following individualized dose reduction techniques w
ere utilized for this examination:  

1. Automated exposure control.  

2. Adjustment of the mA and/or kV according to patient size.  

3. Use of iterative reconstruction technique.



COMPARISON: 04/05/2021.



FINDINGS: Lung windows through the visualized portions of the bases reveal an uncalcified nodule in t
he right lower lobe on image 3 measuring 6 mm. This is stable. Bone windows reveal no suspicious lesi
ons.



A 9 mm hypoattenuating lesion posteriorly in hepatic segment 7 contains enhancing foci and likely ref
lects a benign hemangioma. It appears stable.



The gallbladder, spleen, pancreas, adrenal glands and kidneys are unremarkable. There are no patholog
ically enlarged lymph nodes.



A cystic lesion in the left adnexa measures 4.0 x 4.0 cm. The right ovary and uterus are unremarkable
. There is no evidence of appendicitis. There is no small bowel obstruction. A soft tissue nodule in 
the fat of the left false pelvis on image 52 is stable at 16 x 10 mm.



IMPRESSION: 

1. 4.0 cm cyst in the left adnexa. Sonographic follow-up to confirm resolution if there is persistent
 concern.

2. A 6 mm nodule in the right lower lobe has been stable for 11 months and is most likely benign. Ano
ther follow-up could be performed in one year if there are risk factors.

3. A 1.6 cm scarlike focus in the left lower quadrant is also stable and is most likely benign in the
 absence of known malignancy.



Electronically signed by: GARRICK Morales MD (2/21/2022 4:41 PM) [facility]

## 2022-02-21 NOTE — PHYS DOC
Past Medical History


Past Medical History:  No Pertinent History


Additional Past Medical Histor:  PRE CANCEROUS CERVICAL SCREEN


 (KALI CALVERT APRN)


Past Surgical History:  No Surgical History


 (KALI CALVERT APRN)


Smoking Status:  Current Every Day Smoker


Additional Information:  


5 CIGARETTES PER DAY.


Alcohol Use:  Occasionally


Drug Use:  Cocaine, Marijuana


Social History Narrative:  MARIJUANA 2/20/22 AND COCAINE 2/19/22


 (KALI CALVERT APRN)





General Adult


EDM:


Chief Complaint:  ABDOMINAL PAIN





HPI:


HPI:





Patient is a 46  year old female who presents to the ED today complaining of 5 

out of 10 bilateral lower abdominal pain worse on the left lower abdomen, 

symptoms have been going on for 3 days.  Patient states she is constipated.  She

states she has not had a normal bowel movement for 3 days, she states the last 

time she had a bowel movement was she had bright red blood on her on the stool. 

She states she is currently on her menstrual cycle.  Denies any nausea, 

vomiting.  


 (KALI CALVERT APRN)





Review of Systems:


Review of Systems:


Constitutional:   Denies fever or chills. []


Eyes:   Denies change in visual acuity. []


HENT:   Denies nasal congestion or sore throat. [] 


Respiratory:   Denies cough or shortness of breath. [] 


Cardiovascular:   Denies chest pain or edema. [] 


GI:   Reports abdominal pain, reports constipation, denies nausea, vomiting, 

bloody stools or diarrhea. [] 


:  Denies dysuria. [] 


Musculoskeletal:   Denies back pain or joint pain. [] 


Integument:   Denies rash. [] 


Neurologic:   Denies headache, focal weakness or sensory changes. [] 


Psychiatric:  Denies depression or anxiety. []


 (KALI CALVERT APRN)





Heart Score:


C/O Chest Pain:  N/A


Risk Factors:


Risk Factors:  DM, Current or recent (<one month) smoker, HTN, HLP, family 

history of CAD, obesity.


Risk Scores:


Score 0 - 3:  2.5% MACE over next 6 weeks - Discharge Home


Score 4 - 6:  20.3% MACE over next 6 weeks - Admit for Clinical Observation


Score 7 - 10:  72.7% MACE over next 6 weeks - Early Invasive Strategies


 (KALI CALVERT APRN)





Current Medications:





Current Medications








 Medications


  (Trade)  Dose


 Ordered  Sig/You  Start Time


 Stop Time Status Last Admin


Dose Admin


 


 Info


  (CONTRAST GIVEN


 -- Rx MONITORING)  1 each  PRN DAILY  PRN  2/21/22 16:00


 2/23/22 15:59   





 


 Iohexol


  (Omnipaque 300


 Mg/ml)  75 ml  1X  ONCE  2/21/22 16:00


 2/21/22 16:01 DC 2/21/22 16:03


75 ML








 (NEHALKALI PIYUSH APRN)





Allergies:


Allergies:





Allergies








Coded Allergies Type Severity Reaction Last Updated Verified


 


  No Known Drug Allergies    4/24/16 No








 (ETHANKALI JUAREZ APRN)





Physical Exam:


PE:





Constitutional: Well developed, well nourished, no acute distress, non-toxic 

appearance. []


HENT: Normocephalic, atraumatic, bilateral external ears normal, oropharynx 

moist, no oral exudates, nose normal. []


Eyes: PERRLA, EOMI, conjunctiva normal, no discharge. [] 


Neck: Normal range of motion, no tenderness, supple, no stridor. [] 


Cardiovascular:Heart rate regular rhythm, no murmur []


Lungs & Thorax:  Bilateral breath sounds clear to auscultation []


Abdomen: Bowel sounds normal, soft, no tenderness, no masses, no pulsatile 

masses. [] 


Rectal exam was attempted, patient is on her menstrual cycle, there is quite a 

bit of blood around her rectum from the vagina.  I attempted to clean some of  

the blood from the vagina for rectal exam she continued to have blood go down to

 the rectal region making the rectal fecal collection for Hemoccult impossible. 


Skin: Warm, dry, no erythema, no rash. [] 


Back: No tenderness, no CVA tenderness. [] 


Extremities: No tenderness, no cyanosis, no clubbing, ROM intact, no edema. [] 


Neurologic: Alert and oriented X 3, normal motor function, normal sensory 

function, no focal deficits noted. []


Psychologic: Affect normal, judgement normal, mood normal. []


 (KALI CALVERT APRN)





Current Patient Data:


Labs:





                                Laboratory Tests








Test


 2/21/22


15:07 2/21/22


15:32


 


White Blood Count


 8.5 x10^3/uL


(4.0-11.0) 





 


Red Blood Count


 4.09 x10^6/uL


(3.50-5.40) 





 


Hemoglobin


 11.4 g/dL


(12.0-15.5)  L 





 


Hematocrit


 33.5 %


(36.0-47.0)  L 





 


Mean Corpuscular Volume


 82 fL ()


 





 


Mean Corpuscular Hemoglobin 28 pg (25-35)   


 


Mean Corpuscular Hemoglobin


Concent 34 g/dL


(31-37) 





 


Red Cell Distribution Width


 15.4 %


(11.5-14.5)  H 





 


Platelet Count


 344 x10^3/uL


(140-400) 





 


Neutrophils (%) (Auto) 62 % (31-73)   


 


Lymphocytes (%) (Auto) 28 % (24-48)   


 


Monocytes (%) (Auto) 5 % (0-9)   


 


Eosinophils (%) (Auto) 4 % (0-3)  H 


 


Basophils (%) (Auto) 1 % (0-3)   


 


Neutrophils # (Auto)


 5.3 x10^3/uL


(1.8-7.7) 





 


Lymphocytes # (Auto)


 2.4 x10^3/uL


(1.0-4.8) 





 


Monocytes # (Auto)


 0.4 x10^3/uL


(0.0-1.1) 





 


Eosinophils # (Auto)


 0.3 x10^3/uL


(0.0-0.7) 





 


Basophils # (Auto)


 0.1 x10^3/uL


(0.0-0.2) 





 


Sodium Level


 141 mmol/L


(136-145) 





 


Potassium Level


 4.0 mmol/L


(3.5-5.1) 





 


Chloride Level


 106 mmol/L


() 





 


Carbon Dioxide Level


 26 mmol/L


(21-32) 





 


Anion Gap 9 (6-14)   


 


Blood Urea Nitrogen


 8 mg/dL (7-20)


 





 


Creatinine


 0.7 mg/dL


(0.6-1.0) 





 


Estimated GFR


(Cockcroft-Gault) 109.0  


 





 


BUN/Creatinine Ratio 11 (6-20)   


 


Glucose Level


 116 mg/dL


(70-99)  H 





 


Calcium Level


 8.4 mg/dL


(8.5-10.1)  L 





 


Total Bilirubin


 0.2 mg/dL


(0.2-1.0) 





 


Aspartate Amino Transferase


(AST) 17 U/L (15-37)


 





 


Alanine Aminotransferase (ALT)


 23 U/L (14-59)


 





 


Alkaline Phosphatase


 84 U/L


() 





 


Total Protein


 6.8 g/dL


(6.4-8.2) 





 


Albumin


 3.3 g/dL


(3.4-5.0)  L 





 


Albumin/Globulin Ratio


 0.9 (1.0-1.7)


L 





 


Lipase


 139 U/L


() 





 


Urine Collection Type  Unknown  


 


Urine Color  Red  


 


Urine Clarity  Bloody  


 


Urine pH   (<5.0-8.0)  


 


Urine Specific Gravity


 


 (1.000-1.030)





 


Urine Protein


 


 mg/dL


(NEG-TRACE)


 


Urine Glucose (UA)   mg/dL (NEG)  


 


Urine Ketones (Stick)   mg/dL (NEG)  


 


Urine Blood   (NEG)  


 


Urine Nitrite   (NEG)  


 


Urine Bilirubin   (NEG)  


 


Urine Urobilinogen Dipstick


 


 mg/dL (0.2


mg/dL)


 


Urine Leukocyte Esterase   (NEG)  


 


Urine RBC


 


 Tntc /HPF


(0-2)


 


Urine WBC


 


 11-20 /HPF


(0-4)


 


Urine Squamous Epithelial


Cells 


 Many /LPF  





 


Urine Amorphous Sediment  Present /HPF  


 


Urine Bacteria


 


 Many /HPF


(0-FEW)


 


Urine Mucus  Marked /LPF  





                                Laboratory Tests


2/21/22 15:07








                                Laboratory Tests


2/21/22 15:07








Vital Signs:





                                   Vital Signs








  Date Time  Temp Pulse Resp B/P (MAP) Pulse Ox O2 Delivery O2 Flow Rate FiO2


 


2/21/22 15:31  75 16 141/88 (105) 99 Room Air  


 


2/21/22 14:43 98.3       





 98.3       








 (KALI CALVERT)





EKG:


EKG:


[]


 (KALI CALVERT)





Radiology/Procedures:


Radiology/Procedures:


[]PROCEDURE: CT ABD PELV W/ IV CONTRST ONLY





EXAM: CT ABDOMEN/PELVIS WITH CONTRAST.





HISTORY: Abdominal pain, bloody stools.





TECHNIQUE: Computed tomography of the abdomen and pelvis was performed after the

 intravenous administration of iodinated contrast. One or more of the following 

individualized dose reduction techniques were utilized for this examination:  


1. Automated exposure control.  


2. Adjustment of the mA and/or kV according to patient size.  


3. Use of iterative reconstruction technique.





COMPARISON: 04/05/2021.





FINDINGS: Lung windows through the visualized portions of the bases reveal an 

uncalcified nodule in the right lower lobe on image 3 measuring 6 mm. This is 

stable. Bone windows reveal no suspicious lesions.





A 9 mm hypoattenuating lesion posteriorly in hepatic segment 7 contains 

enhancing foci and likely reflects a benign hemangioma. It appears stable.





The gallbladder, spleen, pancreas, adrenal glands and kidneys are unremarkable. 

There are no pathologically enlarged lymph nodes.





A cystic lesion in the left adnexa measures 4.0 x 4.0 cm. The right ovary and 

uterus are unremarkable. There is no evidence of appendicitis. There is no small

 bowel obstruction. A soft tissue nodule in the fat of the left false pelvis on 

image 52 is stable at 16 x 10 mm.





IMPRESSION: 


1. 4.0 cm cyst in the left adnexa. Sonographic follow-up to confirm resolution 

if there is persistent concern.


2. A 6 mm nodule in the right lower lobe has been stable for 11 months and is 

most likely benign. Another follow-up could be performed in one year if there 

are risk factors.


3. A 1.6 cm scarlike focus in the left lower quadrant is also stable and is most

 likely benign in the absence of known malignancy.





Electronically signed by: GARRICK Morales MD (2/21/2022 4:41 PM) [facility]














DICTATED and SIGNED BY:     CELSO MORALES MD


DATE:     02/21/22 [id number]


 (KALI CALVERT APRN)





Course & Med Decision Making:


Course & Med Decision Making


Pertinent Labs and Imaging studies reviewed. (See chart for details)





This is a 46-year-old female patient presented to the ED today complaining of 

bilateral lower abdominal pain worse on the left, symptoms for 3 days, also 

complaining of constipation.  Also complaining of bloody stools noted 3 days ago

 during a bowel movement





CBC with a normal WBC, hemoglobin 11.4 and hematocrit of 33.5





CMP with no acute findings.  UA negative not readable due to blood in urine





CT of the abdomen and pelvis noted for 4.0 cm cyst in the left adnexa. 

Sonographic follow-up to confirm resolution if there is persistent concern.


 A 6 mm nodule in the right lower lobe has been stable for 11 months and is most

 likely benign. Another follow-up could be performed in one year if there are 

risk factors. A 1.6 cm scarlike focus in the left lower quadrant is also stable 

and is most likely benign in the absence of known malignancy





Above results were discussed with patient. I Highly recommended patient to 

follow-up with an OB/GYN  to confirm resolution of the left adnexal cyst.  She 

was also provided PCP to follow-up for the lung nodule





 (KALI CALVERT)


Course & Med Decision Making


I have reviewed the PA/NP's note and plan of care. I was available for 

consultation as needed during the patient's visit in the emergency department.  

I discussed this case with JESSICA Mcleod after the patient was discharged.


A pelvic ultrasound was recommended to attempt to exclude ovarian torsion in the

 setting of her abdominal pain and visualize 4 cm cyst on CT.  The patient 

declined the ultrasound. The patient also refused a pelvic exam. She was 

provided with OB f/u information with on-call OB, Dr. Guzmán.


 (CELSO CHOWDHURY MD)


Dragon Disclaimer:


Dragon Disclaimer:


This electronic medical record was generated, in whole or in part, using a voice

 recognition dictation system.


 (KALI CALVERT)





Departure


Departure


Impression:  


   Primary Impression:  


   Dysmenorrhea


   Additional Impressions:  


   Left ovarian cyst


   Lung nodule


Disposition:  01 HOME / SELF CARE / HOMELESS


Condition:  STABLE


Referrals:  


NO PCP (PCP)








MIGUEL GUZMÁN MD


follow up in one week


Patient Instructions:  Dysmenorrhea, Easy-to-Read, Ovarian Cyst





Additional Instructions:  


You were evaluated in the emergency room for abdominal pain, please follow-up 

with the provided OB/GYN for the left adnexal cyst, follow-up with the PCP as 

well for the right lung nodule.  You just need to follow-up to make sure he does

 not turn into anything bad











KALI CALVERT            Feb 21, 2022 17:26


CELSO CHOWDHURY MD              Feb 21, 2022 18:30